# Patient Record
Sex: MALE | Race: WHITE | Employment: OTHER
[De-identification: names, ages, dates, MRNs, and addresses within clinical notes are randomized per-mention and may not be internally consistent; named-entity substitution may affect disease eponyms.]

---

## 2017-01-01 ENCOUNTER — HOME CARE VISIT (OUTPATIENT)
Dept: SCHEDULING | Facility: HOME HEALTH | Age: 72
End: 2017-01-01
Payer: MEDICARE

## 2017-01-01 ENCOUNTER — HOME CARE VISIT (OUTPATIENT)
Dept: HOSPICE | Facility: HOSPICE | Age: 72
End: 2017-01-01
Payer: MEDICARE

## 2017-01-01 ENCOUNTER — HOSPITAL ENCOUNTER (INPATIENT)
Age: 72
LOS: 2 days | Discharge: HOSPICE | End: 2017-01-13
Attending: INTERNAL MEDICINE | Admitting: INTERNAL MEDICINE

## 2017-01-01 VITALS
HEART RATE: 81 BPM | DIASTOLIC BLOOD PRESSURE: 52 MMHG | RESPIRATION RATE: 16 BRPM | TEMPERATURE: 100.2 F | OXYGEN SATURATION: 93 % | SYSTOLIC BLOOD PRESSURE: 122 MMHG

## 2017-01-01 VITALS
RESPIRATION RATE: 16 BRPM | OXYGEN SATURATION: 97 % | DIASTOLIC BLOOD PRESSURE: 58 MMHG | HEART RATE: 58 BPM | SYSTOLIC BLOOD PRESSURE: 127 MMHG

## 2017-01-01 VITALS
RESPIRATION RATE: 16 BRPM | OXYGEN SATURATION: 97 % | HEART RATE: 66 BPM | SYSTOLIC BLOOD PRESSURE: 118 MMHG | DIASTOLIC BLOOD PRESSURE: 70 MMHG

## 2017-01-01 VITALS
TEMPERATURE: 98.6 F | HEART RATE: 74 BPM | OXYGEN SATURATION: 94 % | OXYGEN SATURATION: 90 % | RESPIRATION RATE: 18 BRPM | DIASTOLIC BLOOD PRESSURE: 50 MMHG | RESPIRATION RATE: 18 BRPM | TEMPERATURE: 96 F | SYSTOLIC BLOOD PRESSURE: 98 MMHG | HEART RATE: 90 BPM

## 2017-01-01 VITALS
SYSTOLIC BLOOD PRESSURE: 150 MMHG | DIASTOLIC BLOOD PRESSURE: 80 MMHG | TEMPERATURE: 98 F | RESPIRATION RATE: 34 BRPM | HEART RATE: 104 BPM | OXYGEN SATURATION: 88 %

## 2017-01-01 VITALS
OXYGEN SATURATION: 84 % | RESPIRATION RATE: 20 BRPM | SYSTOLIC BLOOD PRESSURE: 90 MMHG | DIASTOLIC BLOOD PRESSURE: 58 MMHG | HEART RATE: 100 BPM

## 2017-01-01 VITALS
RESPIRATION RATE: 16 BRPM | HEART RATE: 70 BPM | SYSTOLIC BLOOD PRESSURE: 122 MMHG | OXYGEN SATURATION: 96 % | DIASTOLIC BLOOD PRESSURE: 70 MMHG

## 2017-01-01 VITALS
SYSTOLIC BLOOD PRESSURE: 124 MMHG | DIASTOLIC BLOOD PRESSURE: 62 MMHG | HEART RATE: 102 BPM | OXYGEN SATURATION: 94 % | RESPIRATION RATE: 14 BRPM

## 2017-01-01 VITALS
OXYGEN SATURATION: 95 % | DIASTOLIC BLOOD PRESSURE: 62 MMHG | RESPIRATION RATE: 16 BRPM | HEART RATE: 58 BPM | SYSTOLIC BLOOD PRESSURE: 122 MMHG

## 2017-01-01 VITALS
RESPIRATION RATE: 16 BRPM | OXYGEN SATURATION: 90 % | DIASTOLIC BLOOD PRESSURE: 50 MMHG | HEART RATE: 82 BPM | SYSTOLIC BLOOD PRESSURE: 80 MMHG

## 2017-01-01 VITALS
HEART RATE: 90 BPM | OXYGEN SATURATION: 96 % | RESPIRATION RATE: 18 BRPM | DIASTOLIC BLOOD PRESSURE: 78 MMHG | SYSTOLIC BLOOD PRESSURE: 120 MMHG

## 2017-01-01 VITALS — HEART RATE: 80 BPM | RESPIRATION RATE: 16 BRPM | DIASTOLIC BLOOD PRESSURE: 60 MMHG | SYSTOLIC BLOOD PRESSURE: 100 MMHG

## 2017-01-01 VITALS
SYSTOLIC BLOOD PRESSURE: 100 MMHG | DIASTOLIC BLOOD PRESSURE: 76 MMHG | OXYGEN SATURATION: 94 % | HEART RATE: 88 BPM | RESPIRATION RATE: 24 BRPM

## 2017-01-01 VITALS
RESPIRATION RATE: 24 BRPM | HEART RATE: 78 BPM | OXYGEN SATURATION: 90 % | SYSTOLIC BLOOD PRESSURE: 130 MMHG | DIASTOLIC BLOOD PRESSURE: 68 MMHG

## 2017-01-01 VITALS — HEART RATE: 80 BPM | RESPIRATION RATE: 14 BRPM | OXYGEN SATURATION: 92 %

## 2017-01-01 VITALS
HEART RATE: 78 BPM | SYSTOLIC BLOOD PRESSURE: 122 MMHG | RESPIRATION RATE: 18 BRPM | OXYGEN SATURATION: 96 % | DIASTOLIC BLOOD PRESSURE: 80 MMHG

## 2017-01-01 VITALS — HEART RATE: 72 BPM | OXYGEN SATURATION: 86 % | RESPIRATION RATE: 16 BRPM

## 2017-01-01 VITALS
SYSTOLIC BLOOD PRESSURE: 122 MMHG | RESPIRATION RATE: 16 BRPM | HEART RATE: 80 BPM | DIASTOLIC BLOOD PRESSURE: 64 MMHG | OXYGEN SATURATION: 96 %

## 2017-01-01 VITALS — RESPIRATION RATE: 20 BRPM | SYSTOLIC BLOOD PRESSURE: 120 MMHG | HEART RATE: 76 BPM | DIASTOLIC BLOOD PRESSURE: 72 MMHG

## 2017-01-01 VITALS — DIASTOLIC BLOOD PRESSURE: 70 MMHG | SYSTOLIC BLOOD PRESSURE: 108 MMHG

## 2017-01-01 PROCEDURE — 0651 HSPC ROUTINE HOME CARE

## 2017-01-01 PROCEDURE — G0299 HHS/HOSPICE OF RN EA 15 MIN: HCPCS

## 2017-01-01 PROCEDURE — A4213 20+ CC SYRINGE ONLY: HCPCS

## 2017-01-01 PROCEDURE — 0656 HSPC GENERAL INPATIENT

## 2017-01-01 PROCEDURE — 74011000250 HC RX REV CODE- 250: Performed by: INTERNAL MEDICINE

## 2017-01-01 PROCEDURE — 74011250637 HC RX REV CODE- 250/637: Performed by: FAMILY MEDICINE

## 2017-01-01 PROCEDURE — A6212 FOAM DRG <=16 SQ IN W/BORDER: HCPCS

## 2017-01-01 PROCEDURE — HOSPICE MEDICATION HC HH HOSPICE MEDICATION

## 2017-01-01 PROCEDURE — A5120 SKIN BARRIER, WIPE OR SWAB: HCPCS

## 2017-01-01 PROCEDURE — 74011000250 HC RX REV CODE- 250: Performed by: FAMILY MEDICINE

## 2017-01-01 PROCEDURE — A4320 IRRIGATION TRAY: HCPCS

## 2017-01-01 PROCEDURE — G0300 HHS/HOSPICE OF LPN EA 15 MIN: HCPCS

## 2017-01-01 PROCEDURE — 74011250637 HC RX REV CODE- 250/637: Performed by: INTERNAL MEDICINE

## 2017-01-01 PROCEDURE — A6248 HYDROGEL DRSG GEL FILLER: HCPCS

## 2017-01-01 PROCEDURE — 74011250636 HC RX REV CODE- 250/636: Performed by: INTERNAL MEDICINE

## 2017-01-01 PROCEDURE — A4657 SYRINGE W/WO NEEDLE: HCPCS

## 2017-01-01 PROCEDURE — A4331 EXTENSION DRAINAGE TUBING: HCPCS

## 2017-01-01 PROCEDURE — G0155 HHCP-SVS OF CSW,EA 15 MIN: HCPCS

## 2017-01-01 PROCEDURE — A4358 URINARY LEG OR ABDOMEN BAG: HCPCS

## 2017-01-01 PROCEDURE — 3336590001 HSPC ROOM AND BOARD

## 2017-01-01 PROCEDURE — A6216 NON-STERILE GAUZE<=16 SQ IN: HCPCS

## 2017-01-01 PROCEDURE — A6209 FOAM DRSG <=16 SQ IN W/O BDR: HCPCS

## 2017-01-01 PROCEDURE — A9270 NON-COVERED ITEM OR SERVICE: HCPCS

## 2017-01-01 PROCEDURE — A6250 SKIN SEAL PROTECT MOISTURIZR: HCPCS

## 2017-01-01 PROCEDURE — A4217 STERILE WATER/SALINE, 500 ML: HCPCS

## 2017-01-01 PROCEDURE — A4649 SURGICAL SUPPLIES: HCPCS

## 2017-01-01 PROCEDURE — A4450 NON-WATERPROOF TAPE: HCPCS

## 2017-01-01 PROCEDURE — 74011000258 HC RX REV CODE- 258: Performed by: INTERNAL MEDICINE

## 2017-01-01 PROCEDURE — A4357 BEDSIDE DRAINAGE BAG: HCPCS

## 2017-01-01 PROCEDURE — T4541 LARGE DISPOSABLE UNDERPAD: HCPCS

## 2017-01-01 PROCEDURE — A6210 FOAM DRG >16<=48 SQ IN W/O B: HCPCS

## 2017-01-01 PROCEDURE — A4452 WATERPROOF TAPE: HCPCS

## 2017-01-01 RX ORDER — IPRATROPIUM BROMIDE AND ALBUTEROL SULFATE 2.5; .5 MG/3ML; MG/3ML
3 SOLUTION RESPIRATORY (INHALATION)
Status: DISCONTINUED | OUTPATIENT
Start: 2017-01-01 | End: 2017-01-01 | Stop reason: HOSPADM

## 2017-01-01 RX ORDER — SODIUM CHLORIDE 9 MG/ML
75 INJECTION, SOLUTION INTRAVENOUS CONTINUOUS
Status: DISCONTINUED | OUTPATIENT
Start: 2017-01-01 | End: 2017-01-01 | Stop reason: SDUPTHER

## 2017-01-01 RX ORDER — GUAIFENESIN 100 MG/5ML
81 LIQUID (ML) ORAL DAILY
Status: DISCONTINUED | OUTPATIENT
Start: 2017-01-01 | End: 2017-01-01 | Stop reason: HOSPADM

## 2017-01-01 RX ORDER — SODIUM CHLORIDE 9 MG/ML
75 INJECTION, SOLUTION INTRAVENOUS ONCE
Status: COMPLETED | OUTPATIENT
Start: 2017-01-01 | End: 2017-01-01

## 2017-01-01 RX ORDER — AMOXICILLIN 250 MG
1 CAPSULE ORAL DAILY
Status: DISCONTINUED | OUTPATIENT
Start: 2017-01-01 | End: 2017-01-01 | Stop reason: HOSPADM

## 2017-01-01 RX ORDER — BACLOFEN 10 MG/1
40 TABLET ORAL
Status: DISCONTINUED | OUTPATIENT
Start: 2017-01-01 | End: 2017-01-01 | Stop reason: HOSPADM

## 2017-01-01 RX ORDER — OMEPRAZOLE 20 MG/1
20 CAPSULE, DELAYED RELEASE ORAL
Status: DISCONTINUED | OUTPATIENT
Start: 2017-01-01 | End: 2017-01-01 | Stop reason: HOSPADM

## 2017-01-01 RX ORDER — NYSTATIN 100000 [USP'U]/ML
500000 SUSPENSION ORAL 4 TIMES DAILY
Status: DISCONTINUED | OUTPATIENT
Start: 2017-01-01 | End: 2017-01-01 | Stop reason: HOSPADM

## 2017-01-01 RX ORDER — ACETAMINOPHEN 325 MG/1
650 TABLET ORAL
Status: DISCONTINUED | OUTPATIENT
Start: 2017-01-01 | End: 2017-01-01 | Stop reason: HOSPADM

## 2017-01-01 RX ORDER — FACIAL-BODY WIPES
10 EACH TOPICAL DAILY PRN
Status: DISCONTINUED | OUTPATIENT
Start: 2017-01-01 | End: 2017-01-01 | Stop reason: HOSPADM

## 2017-01-01 RX ORDER — DOXEPIN HYDROCHLORIDE 10 MG/1
10 CAPSULE ORAL EVERY EVENING
Status: DISCONTINUED | OUTPATIENT
Start: 2017-01-01 | End: 2017-01-01

## 2017-01-01 RX ORDER — AMOXICILLIN AND CLAVULANATE POTASSIUM 875; 125 MG/1; MG/1
1 TABLET, FILM COATED ORAL EVERY 12 HOURS
Status: DISCONTINUED | OUTPATIENT
Start: 2017-01-01 | End: 2017-01-01 | Stop reason: HOSPADM

## 2017-01-01 RX ORDER — ACETAMINOPHEN 650 MG/1
650 SUPPOSITORY RECTAL
Status: DISCONTINUED | OUTPATIENT
Start: 2017-01-01 | End: 2017-01-01 | Stop reason: HOSPADM

## 2017-01-01 RX ORDER — AMIODARONE HYDROCHLORIDE 200 MG/1
200 TABLET ORAL DAILY
Status: DISCONTINUED | OUTPATIENT
Start: 2017-01-01 | End: 2017-01-01 | Stop reason: HOSPADM

## 2017-01-01 RX ORDER — LORAZEPAM 2 MG/ML
0.5 CONCENTRATE ORAL
Status: DISCONTINUED | OUTPATIENT
Start: 2017-01-01 | End: 2017-01-01 | Stop reason: HOSPADM

## 2017-01-01 RX ORDER — AMOXICILLIN 250 MG
2 CAPSULE ORAL
Status: DISCONTINUED | OUTPATIENT
Start: 2017-01-01 | End: 2017-01-01 | Stop reason: HOSPADM

## 2017-01-01 RX ORDER — SENNOSIDES 8.8 MG/5ML
5 LIQUID ORAL
Status: DISCONTINUED | OUTPATIENT
Start: 2017-01-01 | End: 2017-01-01 | Stop reason: HOSPADM

## 2017-01-01 RX ORDER — DOCUSATE SODIUM 100 MG/1
100 CAPSULE, LIQUID FILLED ORAL 2 TIMES DAILY
Status: DISCONTINUED | OUTPATIENT
Start: 2017-01-01 | End: 2017-01-01

## 2017-01-01 RX ORDER — MORPHINE SULFATE 20 MG/ML
5 SOLUTION ORAL
Status: DISCONTINUED | OUTPATIENT
Start: 2017-01-01 | End: 2017-01-01 | Stop reason: HOSPADM

## 2017-01-01 RX ADMIN — LIDOCAINE HYDROCHLORIDE 1 G: 10 INJECTION, SOLUTION EPIDURAL; INFILTRATION; INTRACAUDAL; PERINEURAL at 14:36

## 2017-01-01 RX ADMIN — ASPIRIN 81 MG: 81 TABLET, CHEWABLE ORAL at 09:01

## 2017-01-01 RX ADMIN — BACLOFEN 40 MG: 10 TABLET ORAL at 10:13

## 2017-01-01 RX ADMIN — AMIODARONE HYDROCHLORIDE 200 MG: 200 TABLET ORAL at 09:00

## 2017-01-01 RX ADMIN — BACLOFEN 40 MG: 10 TABLET ORAL at 22:09

## 2017-01-01 RX ADMIN — NYSTATIN 500000 UNITS: 500000 SUSPENSION ORAL at 22:41

## 2017-01-01 RX ADMIN — OMEPRAZOLE 20 MG: 20 CAPSULE, DELAYED RELEASE ORAL at 09:02

## 2017-01-01 RX ADMIN — LIDOCAINE HYDROCHLORIDE 1 G: 10 INJECTION, SOLUTION EPIDURAL; INFILTRATION; INTRACAUDAL; PERINEURAL at 18:48

## 2017-01-01 RX ADMIN — BACLOFEN 40 MG: 10 TABLET ORAL at 11:41

## 2017-01-01 RX ADMIN — LORAZEPAM 0.5 MG: 2 SOLUTION, CONCENTRATE ORAL at 00:37

## 2017-01-01 RX ADMIN — DOXEPIN HYDROCHLORIDE 10 MG: 10 CAPSULE ORAL at 22:10

## 2017-01-01 RX ADMIN — BACLOFEN 40 MG: 10 TABLET ORAL at 22:02

## 2017-01-01 RX ADMIN — STANDARDIZED SENNA CONCENTRATE AND DOCUSATE SODIUM 1 TABLET: 8.6; 5 TABLET, FILM COATED ORAL at 09:03

## 2017-01-01 RX ADMIN — IPRATROPIUM BROMIDE AND ALBUTEROL SULFATE 3 ML: .5; 2.5 SOLUTION RESPIRATORY (INHALATION) at 22:19

## 2017-01-01 RX ADMIN — IPRATROPIUM BROMIDE AND ALBUTEROL SULFATE 3 ML: .5; 2.5 SOLUTION RESPIRATORY (INHALATION) at 23:03

## 2017-01-01 RX ADMIN — MORPHINE SULFATE 5 MG: 20 SOLUTION ORAL at 00:37

## 2017-01-01 RX ADMIN — SODIUM CHLORIDE 75 ML/HR: 9 INJECTION, SOLUTION INTRAVENOUS at 12:00

## 2017-01-01 RX ADMIN — NYSTATIN 500000 UNITS: 500000 SUSPENSION ORAL at 09:02

## 2017-01-01 RX ADMIN — ACETAMINOPHEN 650 MG: 325 TABLET, FILM COATED ORAL at 09:03

## 2017-01-01 RX ADMIN — NYSTATIN 500000 UNITS: 500000 SUSPENSION ORAL at 14:36

## 2017-01-01 RX ADMIN — AMOXICILLIN AND CLAVULANATE POTASSIUM 1 TABLET: 875; 125 TABLET, FILM COATED ORAL at 09:01

## 2017-01-01 RX ADMIN — ACETAMINOPHEN 650 MG: 650 SUPPOSITORY RECTAL at 20:46

## 2017-01-12 NOTE — PROGRESS NOTES
0700 Report received from Forrest Phoenix, RN. Pt received in bed , appears to be asleep. He is arousable to name call with many attempts but he drifts back to sleep. Unable to keep eyes open or focus to follow directions to take anything po or swallow. 0800 Pt arouses,  Answers questions by shaking his head in response to questions. Wife is concerned about the pts lethargy and states this is not his normal response even when he has a UTI.  0900 Attempted to feed pt and give meds crushed in applesauce. He is unable to follow command to swallow. His mouth is dry and he is sleeping with mouth open. 1100 Dr Gary Santos in to assess. Pts status changed from Routine to GIP status for change in mental status and declining condition. Pt repositioned to left side. Med changes discussed with the pts wife. IV started right hand for hydration. Pt unable to take in po today. Unable to awaken enough to follow commands. 1300 Family at the bedside. Pt awakens but falls to sleep. To lethargic to eat. Awakens to name call but falls back to sleep. ALTON De León in to speak with the family about plans for long term care. 1600 Awaiting delivery of Rocephin for IM treatment of UTI. Pt repositioned to right side. 1800 Pt appears to be sleeping.        NAME OF PATIENT:  Jagdish Acosta    LEVEL OF CARE:  [facility]    REASON FOR GIP:   Medication adjustment that must be monitored 24/7 and Stabilizing treatment that cannot take place at home    *PATIENT REMAINS ELIGIBLE FOR GIP LEVEL OF CARE AS EVIDENCED BY: (MUST BE ADDRESSED OF PATIENT GIP)change in pt status requiring frequent assessment and change in medications and the route    REASON FOR RESPITE:  na    O2 SAFETY:  Concentrator positioning (6\" from furniture/drapes), Tanks stored in queen , No petroleum based products on face while oxygen in use and Oxygen sign on the door    FALL INTERVENTIONS PROVIDED:   Implemented/recommended use of fall risk identification flag to all team members, Implemented/recommended resources for alarm system (personal alarm, bed alarm, call bell, etc.) , Implemented/recommended environmental changes (remove hazards, lower bed, improve lighting, etc.) and Implemented/recommended increased supervision/assistance    INTERDISPLINARY COMMUNICATION/COLLABORATION:  Physician, MSW, [place] and RN, CNA    NEW MEDICATION INITIATION DOCUMENTATION:  Consulted AT MD to report change in pt status, Obtained Order from Provider for initiation of symptom relief medication /other medication needed and Documentation completed in Clinical Note in [address] [facility]    Reason medication is being initiated:      MD / Provider name consulted re: change in status / initiation of new medication:  Dr Chip Blackburn Symptom(s):  Lethargy, decreased mentation    New Order(s):  Rocephin ordered IM, NS IV infusion    Name of the person notified of the changes:  Wife, ayeshakennajoseph Enpepe    Name of person being taught: wife cary Villareal    Instructions given:  Action and purpose of meds    Side Effects taught:  na    Response to teaching:  verbalizes  Understanding and questions were answered      COMFORTABLE DYING MEASURE:  Is Patient/family satisfied with symptom level?   Yes if symptoms are managed    DISCHARGE PLAN: Long term plans for care are discussed with the family

## 2017-01-12 NOTE — HOSPICE
MSW met with spouse and daughter in front lobby. Spouse shared the events of the week that has led to the pt being here in [facility]. Spouse reported conflicting messages from multiple physicians. MSW helped clarifly hospice levels of care and reported being unsure of pt current level of care. Spouse wanted pt to transfer to [facility] if possible and spouse thought pt would only be here for one night, but MD Chandrika Sagastume has reported that pt is too fragile to move right now. MSW told family that if pt is too fragile to move and declining in his dying process, pt may die here because we don't want him to die in transit, so family would have to come to terms with that. Dr Chandrika Sagastume joined the visit briefly to update family on discharge plan, with reassessment tomorrow for pt to go home or not. MSW spoke to daughter and spouse about what changes would need to take place in the home if pt goes home as a bedbound pt. Some brainstorming took place, some suggestions such as increasing hired aides or additional family help on weekends. Spouse shared that pt wants to die at home, so this is their goal.  Family agreed they had all the equipment they needed but are considering moving hospital be to the living room for pt comfort. Spouse wanted to hold off moving anything until they found out pt status tomorrow. Much education and discussion was had about end of life, the terms 'transitioning\" and [place]' and pt's current sympoms. Family is having a hard time accepting pt may be transitioning because they have heard it before and the pt has bounced back numerous times in the last several years. MSW offered to answer future questions as the conversation covered a great deal of information. MSW assured family that home hospice team will educate and guide family if pt is now bedbound or transitioning in dying process once pt goes home. Spouse knows that MSW will be arranging to transport home if pt is discharged tomorrow.   Family will use [facility] in [place] for cremation.

## 2017-01-12 NOTE — H&P
[facility]  [phone]    Patient Name: Costella Alpers  YOB: 1945    Date of Provider Hospice Visit: 01/12/17    Level of Care:   [x] General Inpatient (GIP)    [] Routine   [] Respite    Location of Care:  [] [facility] [] [address] [place] [address] [] [facility] [] [facility] [x] [facility]  [] Home [] Other:      Hospice Attending: Dr. Edgar Roach terminal diagnosis:  Multiple sclerosis ([facility] (Carmela Lux)     Other Hospice diagnoses:  Functional quadriplegia secondary to MS ([address] (G35, R53.2)  Neurogenic bladder (N31.9)  PAF (paroxysmal atrial fibrillation) (HCC) (I48.0)  SSS (sick sinus syndrome) ([address] (I49.5)  Essential hypertension (I10)  Atherosclerosis of native coronary artery without angina pectoris, unspecified whether native or transplanted heart (I25.10)  Encounter for hospice care (Z51.5)     Benefit Period 1  Start Date: 11/7/2016  End Date: 2/4/2017     HOSPICE NARRATIVE COMPOSED BY PHYSICIAN   Rationale for a prognosis of life expectancy of 6 months or less if the disease follows its normal course:     Costella Alpers is a 73y.o. year old who was admitted to [facility]. The patient's principle diagnosis of acute hypoxic respiratory failure secondary to multilobar pneumonia has resulted in multiple recent admissions to the hospital. He has a primary diagnosis of advanced multiple sclerosis and has been wheelchair bound for 10+ years. Experiencing 6 hospital admissions over the past 4 months for infections all secondary to his MS. Pt has had chronic UTI's leading to sepsis for some time, however his last 4 admissions were all due to hospital acquired pneumonia. Functionally, the patient's Palliative Performance Scale has declined over a period of 4 months and is estimated at 30%. Objective information that support this patients limited prognosis includes: wheelchair bound, now oxygen dependant when he was previously not.  Pt has symptoms of increasing fatigue, weakness, lethargy, anxiety and dyspnea. Pt is total care dependent now. The patient/family chose comfort measures with the support of Hospice. HOSPICE DIAGNOSES   Active Symptoms:  1. Lethargy/Fatigue/weakness  2. Altered mentation  3. Urinary retention  4. Confusion  5 Decline in function  6. Shortness of breath  7. Generalised Pain     PLAN   1. Admit to [facility] under GIP status: pt had come in yesterday as a routine pt since he was unable to void and appeared to be in discomfort. Since admission his robison's catheter was adjusted and he is draining urine now. Pt has UTI and he has declined overnight to a more lethargic state, poorly responsive. Pt unable to swallow at all: no food/fluids/medications: this sudden change in his condition requires close observation, frequent assessment and monitoring till he stabilizes to be able to d/c home and cared for by family. Discussed with Attending Admit to [facility] under GIP status: pt had come in yesterday as a routine pt since he was unable to void and appeared to be in discomfort. Since admission his robison's catheter was adjusted and he is draining urine now. Pt has UTI and he has declined overnight to a more lethargic state, poorly responsive. Pt unable to swallow at all: no food/fluids/medications: this sudden change in his condition requires close observation, frequent assessment and monitoring till he stabilizes to be able to d/c home and cared for by family. Discussed with Attending Dr. Sabino Kenny and she agrees with plan. 2. Check BMP and U/A  3. Start ceftriaxone 1gm IM x 2 doses  4. Start IV and hydrate one bag NS at slow rate 75cc/hr  5. D/c Doxepin and hold other po meds as pt unable to swallow and not safe to administer meds orally at this time.     6. Use comfort medications: morphine and lorazepam with caution and only when needed; please discuss with wife/daughter in room before giving meds to pt.  7.  and SW to support family needs  8. Disposition: home once pt is more awake and stable for transport home. Long conversation with wife and daughter who has just returned from her business trip in [place] today. Family feels very emotional and somewhat frustrated that pt has declined quickly overnight. They do not want him to be discharged home in this condition however the goal is to get him home soon as possible. They had wanted him to go to [id number] [facility] that is close to home so it will be convenient for visit but at this time they are in agreement to keep him here as this is best for his care. They also realize that he may have taken turn for the final worse and may not recover from this but they are appreciative of efforts being taken now to manage and possibly treat for comfort and if this is irreversible state then they will bring him home. Prognosis estimated based on 01/12/17 clinical assessment is:   [] Few to Many Hours  [] Hours to Days   [x] Few to Many Days   [] Days to Weeks   [] Few to Many Weeks   [] Weeks to Months   [] Few to Many Months    Communicated plan of care with: Hospice Case Manager; Hospice IDT; Care Team     GOALS OF CARE     Resuscitation Status: DNR  Durable DNR: [x] Yes [] No    Advance Care Planning 11/1/2016   Patient's Healthcare Decision Maker is: Legal Next of [address]   Primary Decision Maker Name Lorin Heaton   Primary Decision Maker Phone Number [phone]   Primary Decision Maker Relationship to Patient Spouse   Confirm Advance Directive -   Does the patient have other document types -        HISTORY     History obtained from: chart, staff, family    CHIEF COMPLAINT: N/A  The patient is:   [] Verbal  [x] Nonverbal  [x] Unresponsive    HPI/SUBJECTIVE:  Pt is very lethargic and can barely open eyes for a brief moment when called by name. Appears extremely pale and lethargic.         REVIEW OF SYSTEMS     The following systems were: [] reviewed  [x] unable to be reviewed    Positive ROS include:  Constitutional:  Ears/nose/mouth/throat:  Respiratory:  Gastrointestinal:  Musculoskeletal:  Neurologic:   Psychiatric:  Endocrine:       Adult Non-Verbal Pain Assessment Score: 2    Face  [x] 0   No particular expression or smile  [] 1   Occasional grimace, tearing, frowning, wrinkled forehead  [] 2   Frequent grimace, tearing, frowning, wrinkled forehead    Activity (movement)  [x] 0   Lying quietly, normal position  [] 1   Seeking attention through movement or slow, cautious movement  [] 2   Restless, excessive activity and/or withdrawal reflexes    Guarding  [] 0   Lying quietly, no positioning of hands over areas of body  [] 1   Splinting areas of the body, tense  [x] 2   Rigid, stiff    Physiology (vital signs)  [x] 0   Stable vital signs  [] 1   Change in any of the following: SBP > 20mm Hg; HR > 20/minute  [] 2   Change in any of the following: SBP > 30mm Hg; HR > 25/minute    Respiratory  [x] 0   Baseline RR/SpO2, compliant with ventilator  [] 1   RR > 10 above baseline, or 5% drop SpO2, mild asynchrony with ventilator  [] 2   RR > 20 above baseline, or 10% drop SpO2, asynchrony with ventilator     FUNCTIONAL ASSESSMENT     Palliative Performance Scale (PPS): 20%     PSYCHOSOCIAL/SPIRITUAL ASSESSMENT     Active Problems:    * No active hospital problems.  *    Past Medical History   Diagnosis Date    Bladder disorder      due to MS dr Ermias Hill BPH (benign prostatic hyperplasia)     CAD (coronary artery disease)      1 cardiac stent 2004  re cardiac cath 2015    Calculus of kidney     Chronic UTI     Hypercholesterolemia     [facility]      dr Janeen Marrero Neurogenic bladder      1/[facility]    Neurogenic bowel     PPD positive, treated     Tibial fracture 3/21/14     ortho Nolan Goodrich note      Past Surgical History   Procedure Laterality Date    Pr cardiac surg procedure unlist       stent placement    Hx appendectomy      Hx other surgical pt has robison from home 2 wweks ago    Hx heart catheterization  9/19/15     cath report rec'd      Social History   Substance Use Topics    Smoking status: Former Smoker    Smokeless tobacco: Never Used      Comment: quit 15 years    Alcohol use 0.5 oz/week     1 Cans of beer per week      Comment: occasional glass of wine     Family History   Problem Relation Age of Onset    Heart Attack Father     Heart Disease Father     Cancer Mother      breast    Heart Disease Brother       No Known Allergies   Current Facility-Administered Medications   Medication Dose Route Frequency    cefTRIAXone (ROCEPHIN) 1 g in lidocaine (PF) (XYLOCAINE) 10 mg/mL (1 %) IM injection  1 g IntraMUSCular Q24H    0.9% sodium chloride infusion  75 mL/hr IntraVENous ONCE    senna-docusate (PERICOLACE) 8.6-50 mg per tablet 2 Tab  2 Tab Oral BID PRN    sennosides (SENOKOT) 8.8 mg/5 mL syrup 8.8 mg  5 mL Oral BID PRN    bisacodyl (DULCOLAX) suppository 10 mg  10 mg Rectal DAILY PRN    acetaminophen (TYLENOL) tablet 650 mg  650 mg Oral Q4H PRN    Or    acetaminophen (TYLENOL) solution 650 mg  650 mg Oral Q4H PRN    Or    acetaminophen (TYLENOL) suppository 650 mg  650 mg Rectal Q4H PRN    baclofen (LIORESAL) tablet 40 mg  40 mg Oral AC&HS    amoxicillin-clavulanate (AUGMENTIN) 875-125 mg per tablet 1 Tab  1 Tab Oral Q12H    amiodarone (CORDARONE) tablet 200 mg  200 mg Oral DAILY    omeprazole (PRILOSEC) capsule 20 mg  20 mg Oral ACB    aspirin chewable tablet 81 mg  81 mg Oral DAILY    senna-docusate (PERICOLACE) 8.6-50 mg per tablet 1 Tab  1 Tab Oral DAILY    albuterol-ipratropium (DUO-NEB) 2.5 MG-0.5 MG/3 ML  3 mL Nebulization Q4H PRN    morphine (ROXANOL) 100 mg/5 mL (20 mg/mL) concentrated solution 5 mg  5 mg Oral Q1H PRN    LORazepam (INTENSOL) 2 mg/mL oral concentrate 0.5 mg  0.5 mg SubLINGual Q4H PRN        PHYSICAL EXAM     Wt Readings from Last 3 Encounters:   11/07/16 75.8 kg (167 lb)   11/01/16 77.1 kg (169 lb 15.6 oz)   09/30/16 75.8 kg (167 lb)       Visit Vitals    /60 (BP 1 Location: Left arm, BP Patient Position: At rest;Supine)    Pulse 89    Temp 98.9 °F (37.2 °C)    Resp 16    SpO2 96%       Supplemental O2  [x] Yes  [] NO  Last bowel movement:     Currently this patient has:  [x] Peripheral IV [] PICC  [] PORT [] ICD    [x] Robison Catheter [] NG Tube   [] PEG Tube    [] Rectal Tube [] Drain  [] Other:     Constitutional: lethargic, unresponsive, pale, thin, no apparent distress  Eyes: pallor+  ENMT:normal, dry oral mucosa  Cardiovascular: distant heart sounds  Respiratory:  Slightly labored breathing, diminished air entry  Gastrointestinal: soft, sunken, BS +, non tender  Musculoskeletal:thin, no edema  Skin: warm, dry, poor skin turgor  Neurologic: lethargic, unable to assess  Psychiatric: N/A  Other: robison's: cloudy urine with sediments ++++       Pertinent Lab and or Imaging Tests: Total time: 70mts  Counseling / coordination time: 39 mts discussing with family and staff. > 50% counseling / coordination?: Y      This patient meets Hospice General Inpatient (GIP) Level of Care. The precipitating event that resulted in the need for GIP was: pt had come in yesterday as a routine pt since he was unable to void and appeared to be in discomfort. Since admission his robison's catheter was adjusted and he is draining urine now. Pt has UTI and he has declined overnight to a more lethargic state, poorly responsive. Pt unable to swallow at all: no food/fluids/medications: this sudden change in his condition requires close observation, frequent assessment and monitoring till he stabilizes to be able to d/c home and cared for by family. Discussed with Attending  to [facility] under GIP status: pt had come in yesterday as a routine pt since he was unable to void and appeared to be in discomfort. Since admission his robison's catheter was adjusted and he is draining urine now.  Pt has UTI and he has declined overnight to a more lethargic state, poorly responsive. Pt unable to swallow at all: no food/fluids/medications: this sudden change in his condition requires close observation, frequent assessment and monitoring till he stabilizes to be able to d/c home and cared for by family. Discussed with Attending Dr. Valentino Agent and she agrees with plan.      Supporting documentation for GIP need for pain control:  [x] Frequent evaluation by a doctor, nurse practitioner, nurse   [x] Frequent medication adjustment    [x] IVs that cannot be administered at home   [] Aggressive pain management   [] Complicated technical delivery of medications              Supporting documentation for GIP need for symptom control:  [x]  Sudden decline necessitating intensive nursing intervention  []  Uncontrolled / intractable nausea or vomiting   []  Pathological fractures  []  Advanced open wounds requiring frequent skilled care  [] Unmanageable respiratory distress  [x] New or worsening delirium   [] Delirium with behavior issues  [] Imminent death - with skilled nursing needs documented above

## 2017-01-12 NOTE — PROGRESS NOTES
1915  Report taken from previous shift, KHOA Morelos, LINUS. Pt to arrive shortly from home for IV fluids and lab work per Dr. Gwendolynn Koyanagi. O2 placed in room. 2015  Pt arrived via EMR and two attendants. Family along. Pt is 71yo male here for Routine care. Hospice dx: Multiple Sclerosis. Pt is A&Ox2-3, minimally verbal. Bedbound, unable to move extremities. Breathing unlabored on 6L O2 NC. Has rhonci in lungs and congested nonproductive cough. Lower abd distended, bladder palpates full. Carey 16fr draining but brief soaked. Urinary meatus is split. Skin cool, pale, unstageable pressure ulcer on sacrum with dsg loose and wet. Bilateral heels boggy and red. Pt grimacing and moaning softly. Wife Deadra Main and dtr are oriented to unit and appear anxious. Reassurance given. 2025  Carey removed and pt voids all over bed. Carey replaced with 18fr with immediate return of 800cc hazy yellow urine. Clamped. Explained to pt and wife mechanics of sediment clogging Carey. She voices understanding and pt seems comfortable. 2045  Bed bath given by CNA and SHIRAZ Emmanuel RN who also dresses sacral wound. Pt moans with repositioning and leg movement. Carey emptied for additional 600cc urine. Heels floated. 2855  Call to Dr. Efra Mccabe, orders received for home meds to continue and also PRN Roxanol and Ativan. He wants to hold IV fluids and labs for now. 2120  Pt sleeping, mouth breathing unlabored. Wife reading in room and will spend night. 2220  Pt awakens easily. Accepts thickened water with straw and drinks 240cc. PO scheduled meds given in applesauce. Duoneb neb tx given. After pt drinks another 240cc water. 2345  Pt sleeping. 0030   Call bell rings. Pt states sacral area hurts. Pt repositioned. 0040  PRN Roxanol 5mg and Ativan 0.5mg sl given. 0130  Pt sleeping, med effective. 0330  Pt turned and repositioned. No signs of pain. Carey emptied of 950cc hazy urine. 8597  Pt turned and repositioned, hands and feet warm to touch. Temp check 98.7ax. Carey emptied of 200cc.  0700  Report given to oncoming shift. NAME OF PATIENT:  Chris Knutson    LEVEL OF CARE:  Routine    O2 SAFETY:  No petroleum based products on face while oxygen in use and Oxygen sign on the door    FALL INTERVENTIONS PROVIDED:   Implemented/recommended resources for alarm system (personal alarm, bed alarm, call bell, etc.)     INTERDISPLINARY COMMUNICATION/COLLABORATION:  Physician, MSW, [place] and RN, CNA    NEW MEDICATION INITIATION DOCUMENTATION:  No new medication started.     COMFORTABLE DYING MEASURE:  Is Patient/family satisfied with symptom level?  yes    DISCHARGE PLAN:  Return home when symptoms are managed

## 2017-01-13 NOTE — PROGRESS NOTES
[facility] SW/CH VISIT    [facility] Hospice Chaplain Initial Visit and Spiritual Assessment    I visited the room of this pt who was @ [facility] on routine status but was changed to GIP status after rounds this AM. He was in bed, somnolent, breathing via open mouth displaying a slightly soft breath pattern. He was not agitated, not restless, and appeared comfortable. With him was his wife, Snow Hyde. She thanked me for my offer and asked if I knew iMkaela Del Valle who has been a regular visitor to the patient's home. Mikaela Del Valle works with the [address] [employer] and I  had a long, wide-ranging discussion. She is hoping that the pt will \"bounce back from this dip\" and she will be able to take him hope. She also confirmed she was Gnosticism and was well supported. This morning, I provided a ministry of presence, active listening to Snow Hyde offered words of comfort, assurance, spiritual encouragement, as well as a prayer. Snow Hyde asked me to visit whenever I could. I also gave her my contact information and indicated she could reach me if needed or desired. She asked me to say \"hello\" to Maggy and I said I would see her tomorrow and do so. GOALS:  Continue to visit this pt and his family, especially Snow Hyde to provide pastoral care and spiritual support to assist both the pt and them with coping with this unexpected decline. Build trust and familiarity with the family to encourage a discussion of their spiritual thoughts and concerns at a deeper and more personal level if they so choose. Validate the emotions and normalize the anticipatory grief of the family regarding this declining situation. Offer written spiritual material to the family as needed or requested and provide a listening presence when needed. PLAN:   Continue to visit this pt and his family, while he is @ [facility] and I am in this facility, or PRN as requested.    Coordinate w/  Ирина and transfer  Responsibilities back to her when the pt is discharged from the [facility]. Coordinate with Care Team on POC.  VISIT FREQUENCY:   1 wk. 2 starting 1/12 plus 4 prn 14 days.    Marylu Israel, [facility]  [phone]

## 2017-01-13 NOTE — PROGRESS NOTES
0700- Report received from [facility]. Patient wife and daughter found in family room. Discussed their concerns from the night. Patient found resting peacefully in bed. Patient assessed, vitals obtained and repositioned to the right side. 0900- Wife feeding patient yogurt at bedside. Patient able to swallow pills whole, two at a time. 36- MD Miriam Lemus in room with patient's wife and sister in law. Patient resting peacefully in bed. Plan to DC home today. 1237- Patient transportation set up by Phillip Figueroa for 3 pm, patient wife and daughter informed. Discussed plan to give Rocephin IM injection prior to discharge. 1430- Patient wife educated on medication changes and all medications reviewed that are being sent home. All belongings were packed up and patient dressed in clothing ready to be discharged with transportation. 1500- Report called to Gregory Jimenes RN. Stated no questions or concerns. NAME OF PATIENT:  Wing Bowden    LEVEL OF CARE: [facility]     REASON FOR GIP:   Unmanageable respiratory distress and Stabilizing treatment that cannot take place at home    *PATIENT REMAINS ELIGIBLE FOR [facility] LEVEL OF CARE AS EVIDENCED BY: (MUST BE ADDRESSED OF PATIENT GIP)  Patient with copious respiratory secretions requiring frequent suctioning. Weak cough with crackles in lungs, unable to produce a cough.       REASON FOR RESPITE:  na    O2 SAFETY:  Concentrator positioning (6\" from furniture/drapes), Tanks stored in queen , No petroleum based products on face while oxygen in use and Oxygen sign on the door    FALL INTERVENTIONS PROVIDED:   Implemented/recommended use of fall risk identification flag to all team members, Implemented/recommended resources for alarm system (personal alarm, bed alarm, call bell, etc.) , Implemented/recommended environmental changes (remove hazards, lower bed, improve lighting, etc.) and Implemented/recommended increased supervision/assistance    INTERDISPLINARY COMMUNICATION/COLLABORATION:  Physician, MSW, Booker and RN, CNA    NEW MEDICATION INITIATION DOCUMENTATION:  Will discuss with MD on rounds    Reason medication is being initiated:na    MD / Provider name consulted re: change in status / initiation of new medication:na            New Symptom(s):  na    New Order(s):  Na    Name of the person notified of the changes:  na    Name of person being taught:  na    Instructions given:  na    Side Effects taught:  na    Response to teaching:  na      COMFORTABLE DYING MEASURE:  Is Patient/family satisfied with symptom level?  yes    DISCHARGE PLAN:  Discharge to home with home hospice

## 2017-01-13 NOTE — PROGRESS NOTES
[facility]  [phone]    Patient Name: Mak Duong  YOB: 1945    Date of Provider Hospice Visit: 01/13/17    Level of Care:   [x] General Inpatient (GIP)    [] Routine   [] Respite    Location of Care:  [] [facility] [place] [] [facility] [] [facility] [] [facility] [x] Hospice House [facility]  [] Home [] Other:      Hospice Attending: Dr. Yefri Blair terminal diagnosis:  Multiple sclerosis ([facility] [address] ([place])      Other Hospice diagnoses:  Functional quadriplegia secondary to MS ([facility] [address] (G35, R53.2)  Neurogenic bladder (N31.9)  PAF (paroxysmal atrial fibrillation) (HCC) (I48.0)  SSS (sick sinus syndrome) ([facility] [address] (I49.5)  Essential hypertension (I10)  Atherosclerosis of native coronary artery without angina pectoris, unspecified whether native or transplanted heart (I25.10)  Encounter for hospice care (Z51.5)      Benefit Period 1  Start Date: 11/7/2016  End Date: 2/4/2017       HOSPICE DIAGNOSES   Active Symptoms:  1. Lethargy/Fatigue/weakness: less today  2. Altered mentation: much improved, pt alert, awake and oriented today  3. Urinary retention: resolved  4. Confusion: resolved  5 Decline in function: chronic   6. Shortness of breath: much improved  7. Generalised Pain: less, stable  8. Cough: observed overnight: much less now. 9. Fever: low grade     PLAN   1. Pt was admitted under GIP status due to sudden change in condition & had required close observation, frequent assessment and monitoring and intervention such as change in medications, IVF hydration, addition of antibiotic for treatment of UTI. Pt also with cough and low grade fever: ? Touch of pneumonia possible. Hopefully antibiotics below have good coverage of both. 2. Give 2nd dose of ceftriaxone 1gm IM today  3. Complete Augmentin x remaining 6 days. 4. Continue comfort medications: morphine and lorazepam with caution and only when needed. Use sedatives/hypnotics with caution.   5.  and SW to support family needs  6. Disposition: home with hospice care today; wife is willing and agreeable. Discussed with Dr. Howard Wei and provided update. Prognosis estimated based on 01/13/17 clinical assessment is:   [] Few to Many Hours  [] Hours to Days   [] Few to Many Days   [x] Days to Weeks   [] Few to Many Weeks   [] Weeks to Months   [] Few to Many Months    Communicated plan of care with: Hospice Case Manager; Hospice IDT; Care Team     GOALS OF CARE     Resuscitation Status: DNR  Durable DNR: [x] Yes [] No    Advance Care Planning 11/1/2016   Patient's Healthcare Decision Maker is: Legal Next of [address]   Primary Decision Maker Name Rosalinda Pulliam   Primary Decision Maker Phone Number [phone]   Primary Decision Maker Relationship to Patient Spouse   Confirm Advance Directive -   Does the patient have other document types -        HISTORY     History obtained from: chart, staff, pt, wife    CHIEF COMPLAINT: i am allright  The patient is:   [x] Verbal  [] Nonverbal  [] Unresponsive    HPI/SUBJECTIVE:  Pt feels much better and is awake, alert, responding with a soft voice. Says he feels OK and wants to go home. REVIEW OF SYSTEMS     The following systems were: [x] reviewed  [] unable to be reviewed    Positive ROS include:  Constitutional: fatigue, weakness  Ears/nose/mouth/throat: secretions  Respiratory:shortness of breath, cough  Gastrointestinal:  Musculoskeletal: generalised pain; intermittent  Neurologic  Psychiatric:  Endocrine:        FUNCTIONAL ASSESSMENT     Palliative Performance Scale (PPS): 30%     PSYCHOSOCIAL/SPIRITUAL ASSESSMENT     Active Problems:    * No active hospital problems.  *    Past Medical History   Diagnosis Date    Bladder disorder      due to MS dr Kylah Bentley BPH (benign prostatic hyperplasia)     CAD (coronary artery disease)      1 cardiac stent 2004  re cardiac cath 2015    Calculus of kidney     Chronic UTI     Hypercholesterolemia     MS (multiple sclerosis) ([facility] [address]       stormy salazar    Neurogenic bladder      1/19/15 [facility]    Neurogenic bowel     PPD positive, treated     Tibial fracture 3/21/14     [facility] note      Past Surgical History   Procedure Laterality Date    Pr cardiac surg procedure unlist       stent placement    Hx appendectomy      Hx other surgical       pt has robison from home 2 wweks ago    Hx heart catheterization  9/19/15     cath report rec'd      Social History   Substance Use Topics    Smoking status: Former Smoker    Smokeless tobacco: Never Used      Comment: quit 15 years    Alcohol use 0.5 oz/week     1 Cans of beer per week      Comment: occasional glass of wine     Family History   Problem Relation Age of Onset    Heart Attack Father     Heart Disease Father     Cancer Mother      breast    Heart Disease Brother       No Known Allergies   Current Facility-Administered Medications   Medication Dose Route Frequency    nystatin (MYCOSTATIN) 100,000 unit/mL oral suspension 500,000 Units  500,000 Units Oral QID    senna-docusate (PERICOLACE) 8.6-50 mg per tablet 2 Tab  2 Tab Oral BID PRN    sennosides (SENOKOT) 8.8 mg/5 mL syrup 8.8 mg  5 mL Oral BID PRN    bisacodyl (DULCOLAX) suppository 10 mg  10 mg Rectal DAILY PRN    acetaminophen (TYLENOL) tablet 650 mg  650 mg Oral Q4H PRN    Or    acetaminophen (TYLENOL) solution 650 mg  650 mg Oral Q4H PRN    Or    acetaminophen (TYLENOL) suppository 650 mg  650 mg Rectal Q4H PRN    baclofen (LIORESAL) tablet 40 mg  40 mg Oral AC&HS    amoxicillin-clavulanate (AUGMENTIN) 875-125 mg per tablet 1 Tab  1 Tab Oral Q12H    amiodarone (CORDARONE) tablet 200 mg  200 mg Oral DAILY    omeprazole (PRILOSEC) capsule 20 mg  20 mg Oral ACB    aspirin chewable tablet 81 mg  81 mg Oral DAILY    senna-docusate (PERICOLACE) 8.6-50 mg per tablet 1 Tab  1 Tab Oral DAILY    albuterol-ipratropium (DUO-NEB) 2.5 MG-0.5 MG/3 ML  3 mL Nebulization Q4H PRN    morphine (ROXANOL) 100 mg/5 mL (20 mg/mL) concentrated solution 5 mg  5 mg Oral Q1H PRN    LORazepam (INTENSOL) 2 mg/mL oral concentrate 0.5 mg  0.5 mg SubLINGual Q4H PRN        PHYSICAL EXAM     Wt Readings from Last 3 Encounters:   11/07/16 75.8 kg (167 lb)   11/01/16 77.1 kg (169 lb 15.6 oz)   09/30/16 75.8 kg (167 lb)       Visit Vitals    /52 (BP 1 Location: Right arm, BP Patient Position: At rest;Lying left side)    Pulse 81    Temp 100.2 °F (37.9 °C)    Resp 16    SpO2 93%       Supplemental O2  [x] Yes  [] NO  Last bowel movement:     Currently this patient has:  [] Peripheral IV [] PICC  [] PORT [] ICD    [x] Robison Catheter [] NG Tube   [] PEG Tube    [] Rectal Tube [] Drain  [] Other:     Constitutional:  pale, thin, no apparent distress. Eyes: pallor+  ENMT: hoarse voice, some upper airway secretions present, cough +  Cardiovascular: distant heart sounds  Respiratory:  slightly labored breathing, diminished air entry  Gastrointestinal: soft, sunken, BS +, non tender  Musculoskeletal:thin, no edema  Skin: warm, dry  Neurologic: no obvious deficits  Psychiatric: calm affect  Other: robison's       Pertinent Lab and or Imaging Tests:           Total time: 35mts   Counseling / coordination time: discussed with wife, daughter, MD, and staff  > 50% counseling / coordination?:

## 2017-01-13 NOTE — PROGRESS NOTES
1915 Report taken from previous shift, MINOR Preston RN. Rounds made. Family at bedside. Pt is 73yo male here for GIP for increasing confusion. Hospice dx: Multiple Sclerosis. Pt is A&Ox1, opens eyes to verbal stimuli. Bedbound, paraplegic. O2 at 6L NC. Lungs coarse, congested cough. Carey 18fr. Skin warm, diaphoretic. Dsg on sacrum intact. Heels boggy, red. TV on for comfort. 1930  Wife leaves for evening. Dtr Dav Dill to remain at bedside. 1945  Vitals taken by CNA. Report temp 99.2ax, sats 85% on 6L.  2000  Pt having audible secretions. Turned and repositioned. Has weak congested cough. Cool packs placed. Oral care done. Noticed signs of oral thrush  2050  PRN tylenol ID given. Dtr at bedside, pt opens eyes to verbal stimulation. Pt accepts sips of thickened juice. 65  Pt awake, tracks with eyes during conversation. Augmentin held. Baclofen crushed and given in apple sauce. Pt accepts of thickened liquid. 2240  Pt turned and repositioned. Call from Dr. [facility] [employer]. Update given. NO: Change IV hydration fluids to 50ml/hr. Start Nystatin s/sw for oral thrush. 2305  Pt continues to have congested cough and audible secretions. Oral suctions for thin, white mucous. Sats 81%. PRN Duoneb tx given and sats increased to 89%. 0015  Oral suctioned for thin secretions. Mouth care done. Dtr has left for an hour. IV fluids d/c as left hand is infiltrated and 3+ edema. Elevated on pillow. 0200  Pt turned and repositioned. Dtr awake in room. Pt has not slept this shift. 0315  Pt continues to be awake, has audible secretions, refusing suction. 0400  Pt awake, Dtr sleeping. Carey emptied of 400cc. 0455  Pt awake, turned and repositioned. Audible secretions. Refusing suctioning. States \"no\" for pain or SOB. O2 sats 74% on 6L.  0630  Wife arrives to unit.   0700  Report given to oncoming shift.       NAME OF PATIENT:  Sly Aparna    LEVEL OF CARE:  GIP    REASON FOR GIP:   Unmanageable respiratory distress, Medication adjustment that must be monitored 24/7 and Stabilizing treatment that cannot take place at home    *PATIENT REMAINS ELIGIBLE FOR GIP LEVEL OF CARE AS EVIDENCED BY: increasing secretions, confusion.     O2 SAFETY:  No petroleum based products on face while oxygen in use and Oxygen sign on the door    FALL INTERVENTIONS PROVIDED:   Implemented/recommended resources for alarm system (personal alarm, bed alarm, call bell, etc.)     INTERDISPLINARY COMMUNICATION/COLLABORATION:  Physician, MSW, Booker and RN, CNA    NEW MEDICATION INITIATION DOCUMENTATION:  Nystatin    Reason medication is being initiated:  Signs of oral thrush    MD / Provider name consulted re: change in status / initiation of new medication:  Dr. Chet Smith    Name of the person notified of the changes:  haley Roser    COMFORTABLE DYING MEASURE:  Is Patient/family satisfied with symptom level?  yes    DISCHARGE PLAN:  Return home when symptoms are managed

## 2017-01-16 NOTE — DISCHARGE SUMMARY
Discharge Summary    [facility]  [facility]  [phone]      Date of Admission: 1/11/2017  Date of Discharge: 1/13/2017    Ines Sandhoff is a 73y.o. year old who was admitted to [facility] at [facility] with a Hospice diagnosis of End Stage MS. The patient was discharged to home for ongoing Hospice care.

## 2017-01-16 NOTE — HOSPICE
Completed arrangements for patient to return home following respite stay at [facility]. Wife requested 3p  - arrangements made for [facility] to  at 3.   Hospice staff and [facility] made aware

## 2017-04-05 ENCOUNTER — HOME CARE VISIT (OUTPATIENT)
Dept: HOSPICE | Facility: HOSPICE | Age: 72
End: 2017-04-05
Payer: MEDICARE